# Patient Record
Sex: MALE | Employment: UNEMPLOYED | URBAN - NONMETROPOLITAN AREA
[De-identification: names, ages, dates, MRNs, and addresses within clinical notes are randomized per-mention and may not be internally consistent; named-entity substitution may affect disease eponyms.]

---

## 2021-01-20 ENCOUNTER — APPOINTMENT (OUTPATIENT)
Dept: GENERAL RADIOLOGY | Age: 51
End: 2021-01-20
Attending: EMERGENCY MEDICINE
Payer: COMMERCIAL

## 2021-01-20 ENCOUNTER — HOSPITAL ENCOUNTER (EMERGENCY)
Age: 51
Discharge: ANOTHER HOSPITAL | End: 2021-01-20
Attending: EMERGENCY MEDICINE
Payer: COMMERCIAL

## 2021-01-20 VITALS
HEIGHT: 68 IN | WEIGHT: 190 LBS | HEART RATE: 75 BPM | BODY MASS INDEX: 28.79 KG/M2 | RESPIRATION RATE: 18 BRPM | OXYGEN SATURATION: 99 % | DIASTOLIC BLOOD PRESSURE: 74 MMHG | TEMPERATURE: 98 F | SYSTOLIC BLOOD PRESSURE: 111 MMHG

## 2021-01-20 DIAGNOSIS — S97.02XA CRUSHING INJURY OF LEFT ANKLE, INITIAL ENCOUNTER: Primary | ICD-10-CM

## 2021-01-20 PROCEDURE — 73630 X-RAY EXAM OF FOOT: CPT

## 2021-01-20 PROCEDURE — 96376 TX/PRO/DX INJ SAME DRUG ADON: CPT

## 2021-01-20 PROCEDURE — 73600 X-RAY EXAM OF ANKLE: CPT

## 2021-01-20 PROCEDURE — 75810000053 HC SPLINT APPLICATION

## 2021-01-20 PROCEDURE — 96365 THER/PROPH/DIAG IV INF INIT: CPT

## 2021-01-20 PROCEDURE — 74011000258 HC RX REV CODE- 258: Performed by: EMERGENCY MEDICINE

## 2021-01-20 PROCEDURE — 74011250636 HC RX REV CODE- 250/636: Performed by: EMERGENCY MEDICINE

## 2021-01-20 PROCEDURE — 96375 TX/PRO/DX INJ NEW DRUG ADDON: CPT

## 2021-01-20 PROCEDURE — 99285 EMERGENCY DEPT VISIT HI MDM: CPT

## 2021-01-20 RX ORDER — MORPHINE SULFATE 4 MG/ML
5 INJECTION, SOLUTION INTRAMUSCULAR; INTRAVENOUS ONCE
Status: DISCONTINUED | OUTPATIENT
Start: 2021-01-20 | End: 2021-01-20

## 2021-01-20 RX ORDER — KETOROLAC TROMETHAMINE 30 MG/ML
30 INJECTION, SOLUTION INTRAMUSCULAR; INTRAVENOUS
Status: COMPLETED | OUTPATIENT
Start: 2021-01-20 | End: 2021-01-20

## 2021-01-20 RX ORDER — MORPHINE SULFATE 4 MG/ML
4 INJECTION, SOLUTION INTRAMUSCULAR; INTRAVENOUS ONCE
Status: COMPLETED | OUTPATIENT
Start: 2021-01-20 | End: 2021-01-20

## 2021-01-20 RX ORDER — MORPHINE SULFATE 10 MG/ML
5 INJECTION, SOLUTION INTRAMUSCULAR; INTRAVENOUS ONCE
Status: COMPLETED | OUTPATIENT
Start: 2021-01-20 | End: 2021-01-20

## 2021-01-20 RX ADMIN — MORPHINE SULFATE 5 MG: 10 INJECTION, SOLUTION INTRAMUSCULAR; INTRAVENOUS at 18:22

## 2021-01-20 RX ADMIN — KETOROLAC TROMETHAMINE 30 MG: 30 INJECTION, SOLUTION INTRAMUSCULAR at 14:19

## 2021-01-20 RX ADMIN — CEFAZOLIN 1 G: 1 INJECTION, POWDER, FOR SOLUTION INTRAMUSCULAR; INTRAVENOUS at 17:05

## 2021-01-20 RX ADMIN — MORPHINE SULFATE 4 MG: 4 INJECTION, SOLUTION INTRAMUSCULAR; INTRAVENOUS at 14:19

## 2021-01-20 RX ADMIN — MORPHINE SULFATE 4 MG: 4 INJECTION, SOLUTION INTRAMUSCULAR; INTRAVENOUS at 15:33

## 2021-01-20 NOTE — ED NOTES
Short leg splint applied to left lower leg. No Bleeding noted on dressing.  Pulses marked by Eliot Vuong RN

## 2021-01-20 NOTE — ED TRIAGE NOTES
Pt had L foot ran over by steel wheel approx 3,000-5,000 lbs. Upon pt arrival, EMS has pt's foot bandaged, bleeding is controlled at this time. EMS rpeorts pulses present distal to injury. Pt states he has no feeling below L ankle. A&O. Respirations even and unlabored.

## 2021-01-20 NOTE — ED NOTES
Dressing applied to left ankle lacerations,4X4 guaze applied annd covered with BAD pads and secured with ACE Wrap. Pulses palpable. Charles Stark

## 2021-01-20 NOTE — ED PROVIDER NOTES
EMERGENCY DEPARTMENT HISTORY AND PHYSICAL EXAM      Date: 1/20/2021  Patient Name: aZch Chen      History of Presenting Illness     Chief Complaint   Patient presents with    Foot Injury       History Provided By: Patient    HPI: Zach Chen, 48 y.o. male with a past medical history significant No significant past medical history presents to the ED with cc of left foot and ankle injury. Patient is left foot and ankle was crushed under a heavy steel wheel of a forklift between the metal wheel and a concrete wall. Severe pain and bleeding at the time. Heavy work boot removed prior to arrival    There are no other complaints, changes, or physical findings at this time. PCP: Shanti Dodd MD    Current Facility-Administered Medications   Medication Dose Route Frequency Provider Last Rate Last Admin    ceFAZolin (ANCEF) 1 g in 0.9% sodium chloride (MBP/ADV) 50 mL MBP  1 g IntraVENous NOW Akin Qiu MD           Past History     Past Medical History:  No past medical history on file. Past Surgical History:  No past surgical history on file. Family History:  No family history on file. Social History:  Social History     Tobacco Use    Smoking status: Not on file   Substance Use Topics    Alcohol use: Not on file    Drug use: Not on file       Allergies: Allergies   Allergen Reactions    Oxycodone Hives         Review of Systems   Review of all other systems negative  Review of Systems    Physical Exam   Middle-aged white male in severe pain  Physical Exam  Vitals signs and nursing note reviewed. Constitutional:       General: He is in acute distress. Appearance: He is not ill-appearing or toxic-appearing. HENT:      Head: Normocephalic and atraumatic. Nose: Nose normal.      Mouth/Throat:      Mouth: Mucous membranes are moist.   Neck:      Musculoskeletal: Normal range of motion and neck supple. No neck rigidity or muscular tenderness. Vascular: No carotid bruit. Cardiovascular:      Rate and Rhythm: Normal rate and regular rhythm. Pulses: Normal pulses. Heart sounds: Normal heart sounds. Pulmonary:      Effort: Pulmonary effort is normal. No respiratory distress. Breath sounds: Normal breath sounds. No wheezing, rhonchi or rales. Abdominal:      General: Abdomen is flat. There is no distension. Palpations: Abdomen is soft. There is no mass. Tenderness: There is no abdominal tenderness. There is no right CVA tenderness, left CVA tenderness, guarding or rebound. Hernia: No hernia is present. Musculoskeletal:         General: Swelling, tenderness, deformity and signs of injury present. Comments: The left lower ankle is massively swollen medial and lateral ankle distal pulses dorsalis pedis and posterior tibial are 2+ and palpable distal sensation all toes intact, minimal movement of toes on instruction there is approximately a 3 cm laceration medially and a regular laceration posteriorly which was appeared the bony protruding through the skin   Skin:     General: Skin is warm and dry. Neurological:      General: No focal deficit present. Mental Status: He is alert and oriented to person, place, and time. Mental status is at baseline. Psychiatric:         Mood and Affect: Mood normal.         Behavior: Behavior normal.         Thought Content: Thought content normal.         Lab and Diagnostic Study Results     Labs -   No results found for this or any previous visit (from the past 12 hour(s)). Radiologic Studies -   [unfilled]  CT Results  (Last 48 hours)    None        CXR Results  (Last 48 hours)    None          Medical Decision Making and ED Course   - I am the first and primary provider for this patient AND AM THE PRIMARY PROVIDER OF RECORD. - I reviewed the vital signs, available nursing notes, past medical history, past surgical history, family history and social history. - Initial assessment performed. The patients presenting problems have been discussed, and the staff are in agreement with the care plan formulated and outlined with them. I have encouraged them to ask questions as they arise throughout their visit. Vital Signs-Reviewed the patient's vital signs. Patient Vitals for the past 12 hrs:   Temp Pulse Resp BP SpO2   01/20/21 1356 98 °F (36.7 °C) 75 18 120/84 98 %         Records Reviewed: Nursing Notes and Old Medical Records    The patient presents with crush injury left foot and ankle with a differential diagnosis of fracture dislocation compartment syndrome    ED Course: Crush injury to the left foot and ankle with massive swelling x-rays show a minimally displaced distal fibula fracture lacerations both medial and lateral ankle concerning for open fracture. Provider Notes (Medical Decision Making):   Crush injury of the ankle intact sensation and pulses at this time though massive marked soft tissue swelling with open wounds will plan on transfer to higher level of care  MDM           Consultations:       Consultations telephone consultation with orthopedic on-call Dr. Maximus Mckeon reviewed x-rays and mechanism of injury and physical exam findings concerning for possible developing compartment syndrome recommends transfer to either [facility] or [facility] access [facility].     No orthopedic trauma available at [facility] patient transferred to [facility]       Procedures and Critical Care       Performed by: Derek Figueroa MD  PROCEDURES: Splint with bulky dressing left lower leg myself and nurse  Procedures               CRITICAL CARE NOTE :  3:55 PM  Amount of Critical Care Time: 30(minutes)    IMPENDING DETERIORATION -traumatic injury left lower leg and foot  ASSOCIATED RISK FACTORS - Vascular Compromise  MANAGEMENT- Bedside Assessment  INTERPRETATION -  Xrays  INTERVENTIONS - vascular control and orthopedic management  CASE REVIEW -orthopedist Dr. Camilla Randolph -Improved and Stable  PERFORMED BY - Self    NOTES   :  I have spent critical care time involved in lab review, consultations with specialist, family decision- making, bedside attention and documentation. This time excludes time spent in any separate billed procedures. During this entire length of time I was immediately available to the patient . Davy Burns MD        Disposition     Disposition: Condition unchanged and stable  Transferred to [facility] patient verbally agreed to transfer and understand the risks involved as outlined in the EMTALA form. Remove if not discharged  DISCHARGE PLAN:  1. There are no discharge medications for this patient. 2.   Follow-up Information    None       3. Return to ED if worse   4. There are no discharge medications for this patient. Diagnosis     Clinical Impression: Crush injury to left ankle and foot with nondisplaced fibular fracture massive soft tissue swelling concerning for developing compartment syndrome  Attestations:    Davy Burns MD    Please note that this dictation was completed with [employer], the computer voice recognition software. Quite often unanticipated grammatical, syntax, homophones, and other interpretive errors are inadvertently transcribed by the computer software. Please disregard these errors. Please excuse any errors that have escaped final proofreading. Thank you.